# Patient Record
Sex: MALE | Race: WHITE | NOT HISPANIC OR LATINO | URBAN - METROPOLITAN AREA
[De-identification: names, ages, dates, MRNs, and addresses within clinical notes are randomized per-mention and may not be internally consistent; named-entity substitution may affect disease eponyms.]

---

## 2018-09-07 ENCOUNTER — OFFICE VISIT - HEALTHEAST (OUTPATIENT)
Dept: FAMILY MEDICINE | Facility: CLINIC | Age: 28
End: 2018-09-07

## 2018-09-07 DIAGNOSIS — B02.9 HERPES ZOSTER WITHOUT COMPLICATION: ICD-10-CM

## 2021-06-02 VITALS — WEIGHT: 158 LBS

## 2021-06-26 NOTE — PROGRESS NOTES
Progress Notes by Keegan Mancia PA-C at 9/7/2018  5:20 PM     Author: Keegan Mancia PA-C Service: -- Author Type: Physician Assistant    Filed: 9/7/2018  9:43 PM Encounter Date: 9/7/2018 Status: Signed    : Keegan Mancia PA-C (Physician Assistant)          Assessment and Plan     Bora was seen today for rash.    Diagnoses and all orders for this visit:    Herpes zoster without complication  -     valACYclovir (VALTREX) 1000 MG tablet; Take 1 tablet (1,000 mg total) by mouth 3 (three) times a day for 7 days.       HPI     Chief Complaint   Patient presents with   ? Rash     Rash on back, poss shingles        Bora Espinoza is a 28 y.o. male seen today for rash on his back for 2 - 3 days.     Rash is occasionally painful, especially when his work bag rubs against the small of his back.  No similar rash or discomfort elsewhere in his body.  Does not recall having chickenpox as a kid.  Does not recall any specific recent exposure to shingles.  No fevers or chills.       Current Outpatient Prescriptions:   ?  cyclobenzaprine (FLEXERIL) 5 MG tablet, Take 1 tablet (5 mg total) by mouth 3 (three) times a day as needed for muscle spasms., Disp: 30 tablet, Rfl: 0  ?  valACYclovir (VALTREX) 1000 MG tablet, Take 1 tablet (1,000 mg total) by mouth 3 (three) times a day for 7 days., Disp: 21 tablet, Rfl: 0     Reviewed and updated: medical history, medications and allergies.     Review of Systems     General: Denies fever, chills.  Does feel mildly fatigued.  Skin: Rash on his left lower back, occasionally painful.     Objective     Vitals:    09/07/18 1737   BP: 108/74   Patient Site: Right Arm   Patient Position: Sitting   Cuff Size: Adult Regular   Pulse: 60   Resp: 18   Temp: 98.3  F (36.8  C)   TempSrc: Oral   SpO2: 98%   Weight: 158 lb (71.7 kg)        Reviewed vital signs.  General: Appears calm, comfortable. Answers questions quickly and appropriately with clear speech. No apparent  distress.  Skin: Pink, warm, dry.  Clustered erythematous papules to the left of the spine in his lower back.  They do not cross the midline.  HENT: Normocephalic, atraumatic.  Neck: Supple.  Cardiovascular: Strong, regular radial pulse.  Respiratory: Normal respiratory effort.  Neuro: Memory and cognition appear normal. Normal gait.  Psych: Mood and affect appear normal.           Imaging:   No results found.    Labs:  No results found for this or any previous visit (from the past 24 hour(s)).     Medical Decision-Making     Bora is generally well-appearing 1-year-old male presents with clustered erythematous papules and small of his back.  Appearance is consistent with shingles.  They do remain within the same dermatome and do not cross the midline, although given the small involved area this is not particularly remarkable.  Their appearance and the sensitivity to touch that he describes are both consistent with shingles.  The best of his knowledge he has not been exposed to shingles nor did he have chickenpox as a child.  No varicella  immunizations on file.  History and exam are both consistent with shingles outbreak, so I prescribed 1 week of valacyclovir.      Reviewed red flags that would trigger a prompt return to the clinic as noted below under patient instructions.  He expressed understanding of these directions and is in agreement with the plan.     Patient Instructions     Patient Instructions     Shingles  Shingles is a viral infection caused by the same virus as chicken pox. Anyone who has had chicken pox may get shingles later in life. The virus stays in the body, but remains dormant (asleep). Shingles often occurs in older persons or persons with lowered immunity. But it can affect anyone at any age.  Shingles starts as a tingling patch of skin on one side of the body. Small, painful blisters may then appear. The rash does not spread to the rest of the body.  Exposure to shingles cannot cause  shingles. However, it can cause chicken pox in anyone who has not had chicken pox or has not been vaccinated. The contagious period ends when all blisters have crusted over (generally about 2 weeks after the illness begins).  After the blisters heal, the affected skin may be sensitive or painful for months (neuralgia). This often gradually goes away.  A shingles vaccine is available. This can help prevent shingles or make it less painful. It is generally recommended for adults over the age of 60 who have had chicken pox in the past, but who have never had shingles. Adults over 60 who have had neither chicken pox nor shingles can prevent both diseases with the chicken pox vaccine. Ask your healthcare provider about these vaccines.  Home care    Medicines may be prescribed to help relieve pain. Take these medicines as directed. Ask your healthcare provider or pharmacist before using over-the-counter medicines for helping treat pain and itching.    In certain cases, antiviral medicines may be prescribed to reduce pain, shorten the illness, and prevent neuralgia. Take these medicines as directed.    Compresses made from a solution of cool water mixed with cornstarch or baking soda may help relieve pain and itching.     Gently wash skin daily with soap and water to help prevent infection.  Be certain to rinse off all of the soap, which can be irritating.    Trim fingernails and try not to scratch. Scratching the sores may leave scars.    Stay home from work or school until all blisters have formed a crust and you are no longer contagious.  Follow-up care  Follow up with your healthcare provider or as directed by our staff.  When to seek medical advice    Fever of 100.4 F (38 C) or higher, or as directed by your healthcare provider    Affected skin is on the face or neck and any of the following occur:  ? Headache  ? Eye pain  ? Changes in vision  ? Sores near the eye  ? Weakness of facial muscles    Pain, redness, or  swelling of a joint    Signs of skin infection: colored drainage from the sores, warmth, increasing redness, or increasing pain  Date Last Reviewed: 9/25/2015 2000-2017 [employer]. [address] [place] [address], [place] [address]. All rights reserved. This information is not intended as a substitute for professional medical care. Always follow your healthcare professional's instructions.            Discussed benefit vs risk of medications, dosing, side effects.  Patient was able to verbalize understanding.  After visit summary was provided for patient.     Bradley Mancia PA-C